# Patient Record
Sex: FEMALE | Race: WHITE | NOT HISPANIC OR LATINO | Employment: OTHER | URBAN - METROPOLITAN AREA
[De-identification: names, ages, dates, MRNs, and addresses within clinical notes are randomized per-mention and may not be internally consistent; named-entity substitution may affect disease eponyms.]

---

## 2017-03-07 ENCOUNTER — OFFICE VISIT (OUTPATIENT)
Dept: OBSTETRICS AND GYNECOLOGY | Facility: CLINIC | Age: 67
End: 2017-03-07

## 2017-03-07 VITALS
SYSTOLIC BLOOD PRESSURE: 140 MMHG | WEIGHT: 288 LBS | BODY MASS INDEX: 47.98 KG/M2 | DIASTOLIC BLOOD PRESSURE: 80 MMHG | HEIGHT: 65 IN

## 2017-03-07 DIAGNOSIS — Z85.42 HISTORY OF ENDOMETRIAL CANCER: Primary | ICD-10-CM

## 2017-03-07 PROCEDURE — 99212 OFFICE O/P EST SF 10 MIN: CPT | Performed by: OBSTETRICS & GYNECOLOGY

## 2017-03-07 NOTE — PROGRESS NOTES
Subjective   Mariely King is a 67 y.o. female for check up for history of endometrial cancer.     History of Present Illness-patient still under the care of Dr. Sharon Luis for her cancer surgery follow-up from 2015.  She is having no symptoms or difficulty at present.    The following portions of the patient's history were reviewed and updated as appropriate: allergies, current medications, past family history, past medical history, past social history, past surgical history and problem list.    Review of Systems   Constitutional: Negative.    HENT: Negative.    Eyes: Negative.    Respiratory: Negative.    Cardiovascular: Negative.    Gastrointestinal: Negative.    Endocrine: Negative.    Genitourinary: Negative.    Musculoskeletal: Negative.    Skin: Negative.    Allergic/Immunologic: Negative.    Neurological: Negative.    Hematological: Negative.    Psychiatric/Behavioral: Negative.        Objective   Physical Exam   Constitutional: She is oriented to person, place, and time. She appears well-developed and well-nourished.   HENT:   Head: Normocephalic and atraumatic.   Eyes: Pupils are equal, round, and reactive to light.   Pulmonary/Chest: Effort normal.   Genitourinary:   Genitourinary Comments: Vaginal exam and vaginal cuff appear to be within normal limits.   Neurological: She is alert and oriented to person, place, and time. She has normal reflexes.   Psychiatric: She has a normal mood and affect. Her behavior is normal. Judgment and thought content normal.   Nursing note and vitals reviewed.      Assessment/Plan   Mariely was seen today for follow-up.    Diagnoses and all orders for this visit:    History of endometrial cancer       Patient to see Dr. Luis in July and will likely be released from her care at that point.

## 2017-06-15 ENCOUNTER — OFFICE VISIT (OUTPATIENT)
Dept: OBSTETRICS AND GYNECOLOGY | Facility: CLINIC | Age: 67
End: 2017-06-15

## 2017-06-15 VITALS — DIASTOLIC BLOOD PRESSURE: 80 MMHG | SYSTOLIC BLOOD PRESSURE: 140 MMHG

## 2017-06-15 DIAGNOSIS — B37.31 VAGINAL YEAST INFECTION: Primary | ICD-10-CM

## 2017-06-15 PROCEDURE — 99212 OFFICE O/P EST SF 10 MIN: CPT | Performed by: OBSTETRICS & GYNECOLOGY

## 2017-06-15 RX ORDER — FLUCONAZOLE 150 MG/1
150 TABLET ORAL ONCE
Qty: 1 TABLET | Refills: 3 | Status: SHIPPED | OUTPATIENT
Start: 2017-06-15 | End: 2017-06-15

## 2017-06-15 NOTE — PROGRESS NOTES
Subjective   Mariely King is a 67 y.o. female with a 2 week history of vaginal irritation..     History of Present Illness-patient has a history of diabetes and recently has been on some antibiotic therapy.  She's had a two-week history of itching and irritation in the vaginal area more on the right side than the left.    The following portions of the patient's history were reviewed and updated as appropriate: allergies, current medications, past family history, past medical history, past social history, past surgical history and problem list.    Review of Systems   Constitutional: Negative.    Gastrointestinal: Negative.    Genitourinary:        Vulvar and vaginal irritation.   Neurological: Negative.    Psychiatric/Behavioral: Negative.        Objective   Physical Exam   Constitutional: She is oriented to person, place, and time. She appears well-developed and well-nourished.   HENT:   Head: Normocephalic and atraumatic.   Eyes: Pupils are equal, round, and reactive to light.   Pulmonary/Chest: Effort normal.   Genitourinary:   Genitourinary Comments: External genitalia with both erythema and some white discharge in the folds of the labia.  Also changes in the inguinal area consistent with a fungal infection.   Neurological: She is alert and oriented to person, place, and time. She has normal reflexes.   Psychiatric: She has a normal mood and affect. Her behavior is normal. Judgment and thought content normal.   Nursing note and vitals reviewed.      Assessment/Plan   Mariely was seen today for vaginal pain.    Diagnoses and all orders for this visit:    Vaginal yeast infection    Other orders  -     fluconazole (DIFLUCAN) 150 MG tablet; Take 1 tablet by mouth 1 (One) Time for 1 dose.       Diflucan 150 mg tablet prescribed for treatment.  Patient will see her oncologist Dr. Luis next month and will follow-up with her she's having any difficulty.

## 2017-11-16 ENCOUNTER — OFFICE VISIT (OUTPATIENT)
Dept: OBSTETRICS AND GYNECOLOGY | Facility: CLINIC | Age: 67
End: 2017-11-16

## 2017-11-16 VITALS — DIASTOLIC BLOOD PRESSURE: 80 MMHG | SYSTOLIC BLOOD PRESSURE: 140 MMHG

## 2017-11-16 DIAGNOSIS — Z85.42 HISTORY OF ENDOMETRIAL CANCER: ICD-10-CM

## 2017-11-16 DIAGNOSIS — Z01.419 WELL WOMAN EXAM WITH ROUTINE GYNECOLOGICAL EXAM: Primary | ICD-10-CM

## 2017-11-16 PROCEDURE — 99397 PER PM REEVAL EST PAT 65+ YR: CPT | Performed by: OBSTETRICS & GYNECOLOGY

## 2017-11-16 RX ORDER — BLOOD SUGAR DIAGNOSTIC
STRIP MISCELLANEOUS
Refills: 15 | COMMUNITY
Start: 2017-10-31

## 2017-11-16 RX ORDER — BUSPIRONE HYDROCHLORIDE 30 MG/1
TABLET ORAL
Refills: 3 | COMMUNITY
Start: 2017-11-11

## 2017-11-16 RX ORDER — LEVOMILNACIPRAN HYDROCHLORIDE 80 MG/1
CAPSULE, EXTENDED RELEASE ORAL
Refills: 5 | COMMUNITY
Start: 2017-11-11

## 2017-11-16 RX ORDER — POTASSIUM CHLORIDE 750 MG/1
TABLET, FILM COATED, EXTENDED RELEASE ORAL
Refills: 5 | COMMUNITY
Start: 2017-11-11

## 2017-11-16 RX ORDER — ALPRAZOLAM 0.5 MG/1
TABLET ORAL
Refills: 3 | COMMUNITY
Start: 2017-10-18

## 2017-11-16 RX ORDER — INSULIN ASPART 100 [IU]/ML
INJECTION, SOLUTION INTRAVENOUS; SUBCUTANEOUS
Refills: 3 | COMMUNITY
Start: 2017-09-25

## 2017-11-16 RX ORDER — LOSARTAN POTASSIUM 100 MG/1
TABLET ORAL
Refills: 3 | COMMUNITY
Start: 2017-10-31

## 2017-11-16 RX ORDER — METOLAZONE 5 MG/1
TABLET ORAL
Refills: 0 | COMMUNITY
Start: 2017-08-30 | End: 2021-04-07

## 2017-11-16 RX ORDER — ARIPIPRAZOLE 15 MG/1
TABLET ORAL
Refills: 5 | COMMUNITY
Start: 2017-11-11

## 2017-11-16 RX ORDER — ISOSORBIDE MONONITRATE 30 MG/1
TABLET, EXTENDED RELEASE ORAL
Refills: 3 | COMMUNITY
Start: 2017-10-18

## 2017-11-16 RX ORDER — AMOXICILLIN 500 MG/1
CAPSULE ORAL
Refills: 5 | COMMUNITY
Start: 2017-08-30 | End: 2018-12-06

## 2017-11-16 RX ORDER — FUROSEMIDE 40 MG/1
TABLET ORAL
Refills: 4 | COMMUNITY
Start: 2017-10-31

## 2017-11-16 RX ORDER — PANTOPRAZOLE SODIUM 40 MG/1
TABLET, DELAYED RELEASE ORAL
Refills: 5 | COMMUNITY
Start: 2017-11-11

## 2017-11-16 RX ORDER — HYDROCODONE BITARTRATE AND ACETAMINOPHEN 5; 325 MG/1; MG/1
TABLET ORAL
Refills: 0 | COMMUNITY
Start: 2017-10-05 | End: 2021-04-07

## 2017-11-16 RX ORDER — INSULIN DETEMIR 100 [IU]/ML
INJECTION, SOLUTION SUBCUTANEOUS
COMMUNITY
Start: 2017-11-15

## 2017-11-16 RX ORDER — LEVOTHYROXINE SODIUM 0.12 MG/1
TABLET ORAL
Refills: 0 | COMMUNITY
Start: 2017-09-13

## 2017-11-16 RX ORDER — ZOLPIDEM TARTRATE 10 MG/1
TABLET ORAL
Refills: 3 | COMMUNITY
Start: 2017-11-11

## 2017-11-16 RX ORDER — LEVOTHYROXINE SODIUM 112 UG/1
TABLET ORAL
Refills: 3 | COMMUNITY
Start: 2017-08-19 | End: 2021-04-07

## 2017-11-16 RX ORDER — PEN NEEDLE, DIABETIC 32GX 5/32"
NEEDLE, DISPOSABLE MISCELLANEOUS
Refills: 12 | COMMUNITY
Start: 2017-10-31

## 2017-11-16 NOTE — PROGRESS NOTES
Subjective   Mariely King is a 67 y.o. female is here today as a self referral for pelvic check..    History of Present Illness-patient was seen by oncologist Dr. Harmon over the summer and is here for her Pap smear and pelvic exam.  She is having no bleeding, discharge or other symptoms.    The following portions of the patient's history were reviewed and updated as appropriate: allergies, current medications, past family history, past medical history, past social history, past surgical history and problem list.    Review of Systems   Constitutional: Negative.    HENT: Negative.    Eyes: Negative.    Respiratory: Negative.    Cardiovascular: Negative.    Gastrointestinal: Negative.    Endocrine: Negative.    Genitourinary: Negative.    Musculoskeletal: Negative.    Skin: Negative.    Allergic/Immunologic: Negative.    Neurological: Negative.    Hematological: Negative.    Psychiatric/Behavioral: Negative.        Objective   Physical Exam   Constitutional: She is oriented to person, place, and time. She appears well-developed and well-nourished.   HENT:   Head: Normocephalic and atraumatic.   Eyes: Pupils are equal, round, and reactive to light.   Pulmonary/Chest: Effort normal.   Abdominal: Soft. She exhibits no distension. There is no tenderness.   Genitourinary: Vagina normal.   Neurological: She is alert and oriented to person, place, and time. She has normal reflexes.   Skin: Skin is warm and dry.   Psychiatric: She has a normal mood and affect. Her behavior is normal. Judgment and thought content normal.   Nursing note and vitals reviewed.        Assessment/Plan   Problems Addressed this Visit     None      Visit Diagnoses     Well woman exam with routine gynecological exam    -  Primary    History of endometrial cancer        Relevant Orders    IGP,rfx Aptima HPV All Pth - ThinPrep Vial, Cervix        Pap smear done today.  Current with mammogram and colonoscopy.  Has received influenza vaccine.

## 2017-11-20 LAB
CONV .: NORMAL
CYTOLOGIST CVX/VAG CYTO: NORMAL
CYTOLOGY CVX/VAG DOC THIN PREP: NORMAL
DX ICD CODE: NORMAL
HIV 1 & 2 AB SER-IMP: NORMAL
OTHER STN SPEC: NORMAL
PATH REPORT.FINAL DX SPEC: NORMAL
STAT OF ADQ CVX/VAG CYTO-IMP: NORMAL

## 2017-12-23 ENCOUNTER — APPOINTMENT (OUTPATIENT)
Dept: GENERAL RADIOLOGY | Facility: HOSPITAL | Age: 67
End: 2017-12-23

## 2017-12-23 ENCOUNTER — APPOINTMENT (OUTPATIENT)
Dept: CT IMAGING | Facility: HOSPITAL | Age: 67
End: 2017-12-23

## 2017-12-23 ENCOUNTER — HOSPITAL ENCOUNTER (EMERGENCY)
Facility: HOSPITAL | Age: 67
Discharge: HOME | End: 2017-12-23
Attending: EMERGENCY MEDICINE | Admitting: EMERGENCY MEDICINE

## 2017-12-23 VITALS
WEIGHT: 290 LBS | HEIGHT: 60 IN | RESPIRATION RATE: 15 BRPM | OXYGEN SATURATION: 100 % | DIASTOLIC BLOOD PRESSURE: 61 MMHG | TEMPERATURE: 98.3 F | BODY MASS INDEX: 56.93 KG/M2 | SYSTOLIC BLOOD PRESSURE: 129 MMHG | HEART RATE: 85 BPM

## 2017-12-23 DIAGNOSIS — S40.011A CONTUSION OF RIGHT SHOULDER, INITIAL ENCOUNTER: ICD-10-CM

## 2017-12-23 DIAGNOSIS — S20.219A CONTUSION OF CHEST WALL, UNSPECIFIED LATERALITY, INITIAL ENCOUNTER: Primary | ICD-10-CM

## 2017-12-23 DIAGNOSIS — S16.1XXA CERVICAL STRAIN, ACUTE, INITIAL ENCOUNTER: ICD-10-CM
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
## 2017-12-23 LAB
ALBUMIN SERPL-MCNC: 4.3 G/DL (ref 3.5–5.2)
ALBUMIN/GLOB SERPL: 1.4 G/DL
ALP SERPL-CCNC: 88 U/L (ref 39–117)
ALT SERPL W P-5'-P-CCNC: 22 U/L (ref 1–33)
ANION GAP SERPL CALCULATED.3IONS-SCNC: 16.2 MMOL/L
AST SERPL-CCNC: 22 U/L (ref 1–32)
BASOPHILS # BLD AUTO: 0.02 10*3/MM3 (ref 0–0.2)
BASOPHILS NFR BLD AUTO: 0.2 % (ref 0–1.5)
BILIRUB SERPL-MCNC: 0.5 MG/DL (ref 0.1–1.2)
BUN BLD-MCNC: 26 MG/DL (ref 8–23)
BUN/CREAT SERPL: 19 (ref 7–25)
CALCIUM SPEC-SCNC: 10.8 MG/DL (ref 8.6–10.5)
CHLORIDE SERPL-SCNC: 97 MMOL/L (ref 98–107)
CO2 SERPL-SCNC: 29.8 MMOL/L (ref 22–29)
CREAT BLD-MCNC: 1.37 MG/DL (ref 0.57–1)
DEPRECATED RDW RBC AUTO: 46.9 FL (ref 37–54)
EOSINOPHIL # BLD AUTO: 0.01 10*3/MM3 (ref 0–0.7)
EOSINOPHIL NFR BLD AUTO: 0.1 % (ref 0.3–6.2)
ERYTHROCYTE [DISTWIDTH] IN BLOOD BY AUTOMATED COUNT: 14.9 % (ref 11.7–13)
GFR SERPL CREATININE-BSD FRML MDRD: 38 ML/MIN/1.73
GLOBULIN UR ELPH-MCNC: 3.1 GM/DL
GLUCOSE BLD-MCNC: 135 MG/DL (ref 65–99)
HCT VFR BLD AUTO: 40.5 % (ref 35.6–45.5)
HGB BLD-MCNC: 13.5 G/DL (ref 11.9–15.5)
IMM GRANULOCYTES # BLD: 0.03 10*3/MM3 (ref 0–0.03)
IMM GRANULOCYTES NFR BLD: 0.3 % (ref 0–0.5)
LYMPHOCYTES # BLD AUTO: 1.53 10*3/MM3 (ref 0.9–4.8)
LYMPHOCYTES NFR BLD AUTO: 15.2 % (ref 19.6–45.3)
MCH RBC QN AUTO: 29 PG (ref 26.9–32)
MCHC RBC AUTO-ENTMCNC: 33.3 G/DL (ref 32.4–36.3)
MCV RBC AUTO: 87.1 FL (ref 80.5–98.2)
MONOCYTES # BLD AUTO: 0.69 10*3/MM3 (ref 0.2–1.2)
MONOCYTES NFR BLD AUTO: 6.9 % (ref 5–12)
NEUTROPHILS # BLD AUTO: 7.76 10*3/MM3 (ref 1.9–8.1)
NEUTROPHILS NFR BLD AUTO: 77.3 % (ref 42.7–76)
PLATELET # BLD AUTO: 184 10*3/MM3 (ref 140–500)
PMV BLD AUTO: 10.5 FL (ref 6–12)
POTASSIUM BLD-SCNC: 3.1 MMOL/L (ref 3.5–5.2)
PROT SERPL-MCNC: 7.4 G/DL (ref 6–8.5)
RBC # BLD AUTO: 4.65 10*6/MM3 (ref 3.9–5.2)
SODIUM BLD-SCNC: 143 MMOL/L (ref 136–145)
WBC NRBC COR # BLD: 10.04 10*3/MM3 (ref 4.5–10.7)

## 2017-12-23 PROCEDURE — 72110 X-RAY EXAM L-2 SPINE 4/>VWS: CPT

## 2017-12-23 PROCEDURE — 73030 X-RAY EXAM OF SHOULDER: CPT

## 2017-12-23 PROCEDURE — 85025 COMPLETE CBC W/AUTO DIFF WBC: CPT | Performed by: PHYSICIAN ASSISTANT

## 2017-12-23 PROCEDURE — 72050 X-RAY EXAM NECK SPINE 4/5VWS: CPT

## 2017-12-23 PROCEDURE — 80053 COMPREHEN METABOLIC PANEL: CPT | Performed by: PHYSICIAN ASSISTANT

## 2017-12-23 PROCEDURE — 71250 CT THORAX DX C-: CPT

## 2017-12-23 PROCEDURE — 71020 HC CHEST PA AND LATERAL: CPT

## 2017-12-23 PROCEDURE — 99284 EMERGENCY DEPT VISIT MOD MDM: CPT

## 2017-12-23 RX ORDER — SODIUM CHLORIDE 0.9 % (FLUSH) 0.9 %
10 SYRINGE (ML) INJECTION AS NEEDED
Status: DISCONTINUED | OUTPATIENT
Start: 2017-12-23 | End: 2017-12-23 | Stop reason: HOSPADM

## 2017-12-23 NOTE — ED PROVIDER NOTES
1600  Received care of the Pt from Jordin Payne PA-C.     Reviewed imaging including:  XR Right shoulder shows no acute fracture.   XR L-spine shows no evidence for an acute abnormality of the cervical or lumbar spine.  XR C-spine shows no evidence for an acute abnormality of the cervical or lumbar spine.  CXR shows small likely atelectasis or scar in the right midlung. Mild subpleural fat or pleural thickening along the right periphery. Tortuous aorta. Follow-up as clinically indicated.    1700  Ordered CT chest for further evaluation.    1725  I spoke with Dr. Cohen regarding the CT chest which shows 3 mm nodules on left lung and Dr. Melendez (radiology) recommends follow up regarding these nodules.     1800  Discussed with Pt that her imaging shows nothing acute and that she can be discharged home. I also informed her that her CT chest shows concerning nodules in her left lung that will require follow up as soon as possible. Pt understands and agrees to all plans. All questions answered.       Documentation assistance provided by hipolito Manjarrez for Roberta Gupta.  Information recorded by the shineibe was done at my direction and has been verified and validated by me.       Graciela Manjarrez  12/23/17 1805       Roberta Gupta PA-C  12/23/17 1806

## 2017-12-23 NOTE — DISCHARGE INSTRUCTIONS
PLEASE READ AND REVIEW ALL DISCHARGE INSTRUCTIONS.     Please follow up with your primary care physician for any further evaluation/treatment and further management of your blood pressure.     Recheck in emergency department for any worsening and/or concerning symptoms.     Take all prescribed medicine as written and continue chronic medication.    Your chest CT is abnormal and needs follow-up in the next 12 months.      Tylenol or ibuprofen as needed for chest wall contusion.

## 2018-08-20 ENCOUNTER — TRANSCRIBE ORDERS (OUTPATIENT)
Dept: ADMINISTRATIVE | Facility: HOSPITAL | Age: 68
End: 2018-08-20

## 2018-08-20 DIAGNOSIS — R91.8 PULMONARY NODULES: Primary | ICD-10-CM

## 2018-11-20 ENCOUNTER — OFFICE VISIT (OUTPATIENT)
Dept: OBSTETRICS AND GYNECOLOGY | Facility: CLINIC | Age: 68
End: 2018-11-20

## 2018-11-20 VITALS
BODY MASS INDEX: 54.77 KG/M2 | SYSTOLIC BLOOD PRESSURE: 120 MMHG | DIASTOLIC BLOOD PRESSURE: 78 MMHG | WEIGHT: 279 LBS | HEIGHT: 60 IN

## 2018-11-20 DIAGNOSIS — Z01.419 WELL WOMAN EXAM WITH ROUTINE GYNECOLOGICAL EXAM: Primary | ICD-10-CM

## 2018-11-20 PROCEDURE — 99397 PER PM REEVAL EST PAT 65+ YR: CPT | Performed by: OBSTETRICS & GYNECOLOGY

## 2018-11-20 NOTE — PROGRESS NOTES
Jt King is a 68 y.o. female is here today as a self referral for annual.    History of Present Illness -here today for annual exam and checkup.  History of cancer of the uterus approximately 10 years ago.  The following portions of the patient's history were reviewed and updated as appropriate: allergies, current medications, past family history, past medical history, past social history, past surgical history and problem list.    Review of Systems   Constitutional: Negative.    HENT: Negative.    Eyes: Negative.    Respiratory: Negative.    Cardiovascular: Negative.    Gastrointestinal: Negative.    Endocrine: Negative.    Genitourinary: Negative.    Musculoskeletal: Negative.    Skin: Negative.    Allergic/Immunologic: Negative.    Neurological: Negative.    Hematological: Negative.    Psychiatric/Behavioral: Negative.        Objective   Physical Exam   Constitutional: She is oriented to person, place, and time. She appears well-developed and well-nourished.   HENT:   Head: Normocephalic and atraumatic.   Nose: Nose normal.   Eyes: Conjunctivae and EOM are normal. Pupils are equal, round, and reactive to light.   Neck: Normal range of motion. Neck supple.   Cardiovascular: Normal rate, regular rhythm and normal heart sounds.   Pulmonary/Chest: Effort normal and breath sounds normal. Right breast exhibits no inverted nipple, no mass, no nipple discharge, no skin change and no tenderness. Left breast exhibits no inverted nipple, no mass, no nipple discharge and no skin change. Breasts are symmetrical. There is no breast swelling.   Abdominal: Soft. Hernia confirmed negative in the right inguinal area and confirmed negative in the left inguinal area.   Genitourinary: Rectum normal. No breast tenderness, discharge or bleeding. Pelvic exam was performed with patient supine. No labial fusion. There is no rash, tenderness, lesion or injury on the right labia. There is no rash, tenderness, lesion or  injury on the left labia. Right adnexum displays no mass, no tenderness and no fullness. Left adnexum displays no mass, no tenderness and no fullness. No erythema or bleeding in the vagina. No foreign body in the vagina. No signs of injury around the vagina. No vaginal discharge found.   Neurological: She is alert and oriented to person, place, and time. She has normal reflexes.   Skin: Skin is warm and dry.   Psychiatric: She has a normal mood and affect. Her behavior is normal. Judgment and thought content normal.         Assessment/Plan   Problems Addressed this Visit     None      Visit Diagnoses     Well woman exam with routine gynecological exam    -  Primary    Relevant Orders    IGP,rfx Aptima HPV All Pth        Current with mammogram and colonoscopy.

## 2018-12-05 ENCOUNTER — HOSPITAL ENCOUNTER (OUTPATIENT)
Dept: CT IMAGING | Facility: HOSPITAL | Age: 68
Discharge: HOME | End: 2018-12-05
Attending: INTERNAL MEDICINE | Admitting: INTERNAL MEDICINE

## 2018-12-05 DIAGNOSIS — R91.8 PULMONARY NODULES: ICD-10-CM

## 2018-12-05 PROCEDURE — 71250 CT THORAX DX C-: CPT

## 2019-11-21 ENCOUNTER — OFFICE VISIT (OUTPATIENT)
Dept: OBSTETRICS AND GYNECOLOGY | Facility: CLINIC | Age: 69
End: 2019-11-21

## 2019-11-21 VITALS
BODY MASS INDEX: 45.93 KG/M2 | SYSTOLIC BLOOD PRESSURE: 126 MMHG | WEIGHT: 269 LBS | HEIGHT: 64 IN | DIASTOLIC BLOOD PRESSURE: 80 MMHG

## 2019-11-21 DIAGNOSIS — Z85.42 HISTORY OF ENDOMETRIAL CANCER: Primary | ICD-10-CM

## 2019-11-21 PROCEDURE — 99212 OFFICE O/P EST SF 10 MIN: CPT | Performed by: OBSTETRICS & GYNECOLOGY

## 2019-11-21 RX ORDER — LANSOPRAZOLE 30 MG/1
30 CAPSULE, DELAYED RELEASE ORAL DAILY
COMMUNITY

## 2019-11-21 RX ORDER — ROSUVASTATIN CALCIUM 5 MG/1
5 TABLET, COATED ORAL
Refills: 5 | COMMUNITY
Start: 2019-10-29 | End: 2021-04-07

## 2019-11-21 RX ORDER — LACTULOSE 10 G/10G
POWDER, FOR SOLUTION ORAL
Refills: 5 | COMMUNITY
Start: 2019-10-29

## 2019-11-21 RX ORDER — CEPHALEXIN 500 MG/1
500 CAPSULE ORAL 2 TIMES DAILY
Refills: 0 | COMMUNITY
Start: 2019-11-19 | End: 2021-04-07

## 2019-11-21 RX ORDER — AMOXICILLIN 500 MG/1
500 CAPSULE ORAL 2 TIMES DAILY
Refills: 0 | COMMUNITY
Start: 2019-11-19 | End: 2021-04-07

## 2019-11-21 RX ORDER — LEVOTHYROXINE SODIUM 137 UG/1
137 TABLET ORAL DAILY
Refills: 1 | COMMUNITY
Start: 2019-10-29 | End: 2021-04-07

## 2019-11-21 RX ORDER — OMEGA-3-ACID ETHYL ESTERS 1 G/1
2 CAPSULE, LIQUID FILLED ORAL
COMMUNITY

## 2019-11-21 NOTE — PROGRESS NOTES
"Subjective    Chief Complaint   Patient presents with   • Follow-up     Pap       History of Present Illness    Mariely King is a 69 y.o. female who presents for follow-up Pap smear.  Patient had endometrial cancer stage I  and here today for follow-up.  Patient obviously had a hysterectomy.  She is having no problems.  No history of vaginal or rectal bleeding.    Obstetric History:  OB History      Para Term  AB Living    1 1 1          SAB TAB Ectopic Molar Multiple Live Births                        Menstrual History:     No LMP recorded. Patient has had a hysterectomy.       Past Medical History:   Diagnosis Date   • Cancer (CMS/HCC)     ENDOMETRIAL CANCER   • Diabetes mellitus (CMS/HCC)    • Hypertension    • Pulmonary HTN (CMS/HCC)      Family History   Problem Relation Age of Onset   • Breast cancer Maternal Aunt        The following portions of the patient's history were reviewed and updated as appropriate: allergies, current medications, past medical history, past surgical history and problem list.    Review of Systems  Negative.       Objective   Physical Exam  Vaginal exam today demonstrated no lesion.  Pap smear performed.  Bimanual and rectovaginal exams totally negative.  /80   Ht 162.6 cm (64\")   Wt 122 kg (269 lb)   BMI 46.17 kg/m²     Assessment/Plan   Mariely was seen today for follow-up.    Diagnoses and all orders for this visit:    History of endometrial cancer  -     IGP,rfx Aptima HPV All Pth        Return to office 1 year for annual exam with Pap.  Patient should have annual Pap smears for at least 5 years.               "

## 2019-11-25 LAB
CONV .: NORMAL
CYTOLOGIST CVX/VAG CYTO: NORMAL
CYTOLOGY CVX/VAG DOC CYTO: NORMAL
CYTOLOGY CVX/VAG DOC THIN PREP: NORMAL
DX ICD CODE: NORMAL
HIV 1 & 2 AB SER-IMP: NORMAL
OTHER STN SPEC: NORMAL
STAT OF ADQ CVX/VAG CYTO-IMP: NORMAL

## 2021-03-21 ENCOUNTER — IMMUNIZATION (OUTPATIENT)
Dept: VACCINE CLINIC | Facility: HOSPITAL | Age: 71
End: 2021-03-21

## 2021-03-21 PROCEDURE — 91300 HC SARSCOV02 VAC 30MCG/0.3ML IM: CPT | Performed by: INTERNAL MEDICINE

## 2021-03-21 PROCEDURE — 0001A: CPT | Performed by: INTERNAL MEDICINE

## 2021-04-07 ENCOUNTER — OFFICE VISIT (OUTPATIENT)
Dept: OBSTETRICS AND GYNECOLOGY | Facility: CLINIC | Age: 71
End: 2021-04-07

## 2021-04-07 VITALS — BODY MASS INDEX: 42.57 KG/M2 | DIASTOLIC BLOOD PRESSURE: 70 MMHG | SYSTOLIC BLOOD PRESSURE: 110 MMHG | WEIGHT: 248 LBS

## 2021-04-07 DIAGNOSIS — Z85.42 HISTORY OF ENDOMETRIAL CANCER: ICD-10-CM

## 2021-04-07 DIAGNOSIS — Z01.419 ENCOUNTER FOR GYNECOLOGICAL EXAMINATION WITHOUT ABNORMAL FINDING: Primary | ICD-10-CM

## 2021-04-07 DIAGNOSIS — Z12.39 ENCOUNTER FOR BREAST CANCER SCREENING USING NON-MAMMOGRAM MODALITY: ICD-10-CM

## 2021-04-07 LAB
DEVELOPER EXPIRATION DATE: NORMAL
DEVELOPER LOT NUMBER: NORMAL
EXPIRATION DATE: NORMAL
FECAL OCCULT BLOOD SCREEN, POC: NEGATIVE
Lab: NORMAL
NEGATIVE CONTROL: NEGATIVE
POSITIVE CONTROL: POSITIVE

## 2021-04-07 PROCEDURE — G0101 CA SCREEN;PELVIC/BREAST EXAM: HCPCS | Performed by: OBSTETRICS & GYNECOLOGY

## 2021-04-07 PROCEDURE — 82274 ASSAY TEST FOR BLOOD FECAL: CPT | Performed by: OBSTETRICS & GYNECOLOGY

## 2021-04-07 RX ORDER — FERROUS SULFATE 325(65) MG
1 TABLET ORAL DAILY
COMMUNITY
Start: 2021-03-15

## 2021-04-07 RX ORDER — POTASSIUM CHLORIDE 20 MEQ/1
20 TABLET, EXTENDED RELEASE ORAL DAILY
COMMUNITY
Start: 2020-12-06

## 2021-04-07 RX ORDER — VALSARTAN 320 MG/1
320 TABLET ORAL DAILY
COMMUNITY
Start: 2021-03-15

## 2021-04-07 RX ORDER — LINACLOTIDE 290 UG/1
290 CAPSULE, GELATIN COATED ORAL DAILY
COMMUNITY
Start: 2021-03-15

## 2021-04-07 RX ORDER — DOXYCYCLINE HYCLATE 100 MG/1
CAPSULE ORAL 2 TIMES DAILY
COMMUNITY
Start: 2021-03-08

## 2021-04-07 NOTE — PROGRESS NOTES
Subjective    Chief Complaint   Patient presents with   • Gynecologic Exam     AE      History of Present Illness    Mariely King is a 71 y.o. female who presents for annual exam.  Non-smoker.  History endometrial cancer with previous hysterectomy.  Mammogram normal 2020 at [facility].  Wants to do Cologuard as low risk for colon cancer.  No bleeding or other issues.  Does not want to do DEXA scan now.    Obstetric History:  OB History        1    Para   1    Term   1            AB        Living           SAB        TAB        Ectopic        Molar        Multiple        Live Births                   Menstrual History:     No LMP recorded. Patient has had a hysterectomy.       Past Medical History:   Diagnosis Date   • Cancer (CMS/HCC)     ENDOMETRIAL CANCER   • Chronic kidney disease    • Diabetes mellitus (CMS/HCC)    • Hypertension    • Pulmonary HTN (CMS/HCC)      Family History   Problem Relation Age of Onset   • Breast cancer Maternal Aunt      Social History     Tobacco Use   Smoking Status Never Smoker         The following portions of the patient's history were reviewed and updated as appropriate: allergies, current medications, past family history, past medical history, past social history, past surgical history and problem list.    Review of Systems   Constitutional: Negative.  Negative for fever and unexpected weight change.   HENT: Negative.    Respiratory: Negative for shortness of breath and wheezing.    Cardiovascular: Negative for chest pain, palpitations and leg swelling.   Gastrointestinal: Negative for abdominal pain, anal bleeding and blood in stool.   Genitourinary: Negative for dysuria, pelvic pain, urgency, vaginal bleeding, vaginal discharge and vaginal pain.   Skin: Negative.    Neurological: Negative.    Hematological: Negative.  Negative for adenopathy.   Psychiatric/Behavioral: Negative.  Negative for dysphoric mood. The patient is not nervous/anxious.              Objective   Physical Exam  Vitals reviewed. Exam conducted with a chaperone present.   Constitutional:       Appearance: She is well-developed.   HENT:      Head: Normocephalic.   Eyes:      Pupils: Pupils are equal, round, and reactive to light.   Neck:      Thyroid: No thyromegaly.      Trachea: No tracheal deviation.   Cardiovascular:      Rate and Rhythm: Normal rate and regular rhythm.      Heart sounds: Normal heart sounds. No murmur heard.     Pulmonary:      Effort: Pulmonary effort is normal. No respiratory distress.      Breath sounds: Normal breath sounds.   Chest:      Breasts:         Right: Normal. No mass, nipple discharge or tenderness.         Left: Normal. No mass, nipple discharge or tenderness.   Abdominal:      Palpations: Abdomen is soft. There is no mass.      Tenderness: There is no abdominal tenderness.      Hernia: No hernia is present.   Genitourinary:     General: Normal vulva.      Labia:         Right: No tenderness or lesion.         Left: No tenderness or lesion.       Urethra: No prolapse or urethral lesion.      Vagina: Normal. No vaginal discharge.      Uterus: Absent.       Adnexa:         Right: No fullness.          Left: No fullness.        Rectum: Normal. Guaiac result negative. No external hemorrhoid or internal hemorrhoid. Normal anal tone.      Comments: External genitalia normal  Lymphadenopathy:      Cervical: No cervical adenopathy.      Upper Body:      Right upper body: No axillary adenopathy.      Left upper body: No axillary adenopathy.   Skin:     General: Skin is warm and dry.      Findings: No rash.   Neurological:      Mental Status: She is alert and oriented to person, place, and time.   Psychiatric:         Behavior: Behavior normal.         /70   Wt 112 kg (248 lb)   BMI 42.57 kg/m²     Assessment/Plan   Diagnoses and all orders for this visit:    1. Encounter for gynecological examination without abnormal finding (Primary)  -     IGP,rfx Aptima  HPV All Pth  -     POC Occult Blood Stool    2. Encounter for breast cancer screening using non-mammogram modality    3. History of endometrial cancer        Scheduling Cologuard.  Return to office 1 year.  Counseled about beginning vitamin D3 for osteoporosis prevention.

## 2021-04-11 ENCOUNTER — IMMUNIZATION (OUTPATIENT)
Dept: VACCINE CLINIC | Facility: HOSPITAL | Age: 71
End: 2021-04-11

## 2021-04-11 PROCEDURE — 0002A: CPT | Performed by: INTERNAL MEDICINE

## 2021-04-11 PROCEDURE — 91300 HC SARSCOV02 VAC 30MCG/0.3ML IM: CPT | Performed by: INTERNAL MEDICINE

## 2021-04-28 ENCOUNTER — TELEPHONE (OUTPATIENT)
Dept: OBSTETRICS AND GYNECOLOGY | Facility: CLINIC | Age: 71
End: 2021-04-28

## 2021-04-28 NOTE — TELEPHONE ENCOUNTER
If she is wanting to order the cologuard. She will need to come in and sign a form or we can mail her the form that she can sign and send back. But I will need this signed before we can get it set up. Thanks

## 2021-04-28 NOTE — TELEPHONE ENCOUNTER
Patient is aware that is not needed for a Cologuard. Patient stated that it is a cologuard she has to do. Patient aware that OTC stool softeners would be just fine. Patient did not have any additional questions.

## 2021-04-28 NOTE — TELEPHONE ENCOUNTER
Good afternoon,  Patient called and stated that she can not take milk of magnesia because she is in renal failure and that a RX would have to be called in for her to do the Cologuard.    Please advise,  Thank you    Pharmacy confirmed - [facility] - [place], [place] - [phone]  - [phone] FX

## 2021-05-13 ENCOUNTER — TELEPHONE (OUTPATIENT)
Dept: OBSTETRICS AND GYNECOLOGY | Facility: CLINIC | Age: 71
End: 2021-05-13
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
